# Patient Record
Sex: FEMALE | Race: BLACK OR AFRICAN AMERICAN
[De-identification: names, ages, dates, MRNs, and addresses within clinical notes are randomized per-mention and may not be internally consistent; named-entity substitution may affect disease eponyms.]

---
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
## 2022-05-06 DIAGNOSIS — Z87.42 PERSONAL HISTORY OF OTHER DISEASES OF THE FEMALE GENITAL TRACT: ICD-10-CM

## 2022-05-06 DIAGNOSIS — Z83.3 FAMILY HISTORY OF DIABETES MELLITUS: ICD-10-CM

## 2022-08-05 ENCOUNTER — APPOINTMENT (OUTPATIENT)
Dept: OBGYN | Facility: CLINIC | Age: 22
End: 2022-08-05

## 2022-08-05 VITALS
DIASTOLIC BLOOD PRESSURE: 70 MMHG | HEIGHT: 62 IN | SYSTOLIC BLOOD PRESSURE: 100 MMHG | BODY MASS INDEX: 26.13 KG/M2 | WEIGHT: 142 LBS

## 2022-08-05 DIAGNOSIS — N92.6 IRREGULAR MENSTRUATION, UNSPECIFIED: ICD-10-CM

## 2022-08-05 PROCEDURE — 99203 OFFICE O/P NEW LOW 30 MIN: CPT

## 2022-08-05 NOTE — END OF VISIT
[FreeTextEntry3] : I, Erika Fagan , acted as a scribe on behalf of Dr. Rosa Isela Zaman on 08/05/2022 .\par \par All medical entries made by the scribe were at my, Dr. Rosa Isela Zaman, direction and personally dictated by me on 08/05/2022  I have reviewed the chart and agree that the record accurately reflects my personal performance of the history, physical exam, assessment and plan. I have also personally directed, reviewed, and agreed with the chart.\par

## 2022-08-05 NOTE — PLAN
[FreeTextEntry1] : 20 y/o female  LMP 22 presents for irregular bleeding.\par \par Irregular menses\par -rx pelvic sono\par -f/u with results to discuss BC options if needed

## 2022-08-05 NOTE — HISTORY OF PRESENT ILLNESS
[FreeTextEntry1] : 20 y/o female  LMP 22 presents for irregular bleeding. Pt menarche at age 12. Pt states intermenstrual bleeding. Pt begins bleeding again 2 weeks after end of last menses.\par \par GynHx: vulvovaginitis \par PmHx: denies\par FamHx: DM (MGM)\par NKDA\par \par \par

## 2023-07-31 ENCOUNTER — TRANSCRIPTION ENCOUNTER (OUTPATIENT)
Age: 23
End: 2023-07-31

## 2023-09-08 ENCOUNTER — APPOINTMENT (OUTPATIENT)
Dept: OBGYN | Facility: CLINIC | Age: 23
End: 2023-09-08
Payer: COMMERCIAL

## 2023-09-08 ENCOUNTER — RESULT REVIEW (OUTPATIENT)
Age: 23
End: 2023-09-08

## 2023-09-08 VITALS
SYSTOLIC BLOOD PRESSURE: 108 MMHG | DIASTOLIC BLOOD PRESSURE: 70 MMHG | HEIGHT: 62 IN | WEIGHT: 142 LBS | BODY MASS INDEX: 26.13 KG/M2

## 2023-09-08 DIAGNOSIS — Z01.419 ENCOUNTER FOR GYNECOLOGICAL EXAMINATION (GENERAL) (ROUTINE) W/OUT ABNORMAL FINDINGS: ICD-10-CM

## 2023-09-08 DIAGNOSIS — N93.9 ABNORMAL UTERINE AND VAGINAL BLEEDING, UNSPECIFIED: ICD-10-CM

## 2023-09-08 DIAGNOSIS — N94.6 DYSMENORRHEA, UNSPECIFIED: ICD-10-CM

## 2023-09-08 PROCEDURE — 99395 PREV VISIT EST AGE 18-39: CPT

## 2023-09-08 PROCEDURE — 99214 OFFICE O/P EST MOD 30 MIN: CPT | Mod: 25

## 2023-09-08 PROCEDURE — 36415 COLL VENOUS BLD VENIPUNCTURE: CPT

## 2023-09-08 RX ORDER — NAPROXEN SODIUM 550 MG/1
550 TABLET ORAL
Qty: 60 | Refills: 6 | Status: ACTIVE | COMMUNITY
Start: 2023-09-08 | End: 1900-01-01

## 2023-09-08 NOTE — HISTORY OF PRESENT ILLNESS
[FreeTextEntry1] : 23yo nullip LMP 8/25/23 here for annual exam.  Reports for the past 2 years she has been experiencing spotting for 1 week, 2 weeks following menses.  Pt also reports painful menses as well as pain w/defecation.  [Regular Cycle Intervals] : periods have been regular [Frequency: Q ___ days] : menstrual periods occur approximately every [unfilled] days [Menarche Age: ____] : age at menarche was [unfilled] [Never active] : never active

## 2023-09-08 NOTE — DISCUSSION/SUMMARY
[FreeTextEntry1] : - hormone panel sent - sono ordered - Naproxen sent for dysmenorrhea - Discussed symptoms likely related to endometriosis

## 2023-09-10 LAB
BASOPHILS # BLD AUTO: 0.01 K/UL
BASOPHILS NFR BLD AUTO: 0.3 %
DHEA-S SERPL-MCNC: 298 UG/DL
EOSINOPHIL # BLD AUTO: 0.04 K/UL
EOSINOPHIL NFR BLD AUTO: 1.1 %
ESTRADIOL SERPL-MCNC: 204 PG/ML
FSH SERPL-MCNC: 6.5 IU/L
HCT VFR BLD CALC: 38 %
HGB BLD-MCNC: 12.2 G/DL
IMM GRANULOCYTES NFR BLD AUTO: 0 %
LH SERPL-ACNC: 45.3 IU/L
LYMPHOCYTES # BLD AUTO: 1.74 K/UL
LYMPHOCYTES NFR BLD AUTO: 49.2 %
MAN DIFF?: NORMAL
MCHC RBC-ENTMCNC: 29.3 PG
MCHC RBC-ENTMCNC: 32.1 GM/DL
MCV RBC AUTO: 91.1 FL
MONOCYTES # BLD AUTO: 0.26 K/UL
MONOCYTES NFR BLD AUTO: 7.3 %
NEUTROPHILS # BLD AUTO: 1.49 K/UL
NEUTROPHILS NFR BLD AUTO: 42.1 %
PLATELET # BLD AUTO: 374 K/UL
PROGEST SERPL-MCNC: 0.3 NG/ML
PROLACTIN SERPL-MCNC: 21.3 NG/ML
RBC # BLD: 4.17 M/UL
RBC # FLD: 13.2 %
T4 FREE SERPL-MCNC: 1.1 NG/DL
TESTOST SERPL-MCNC: 30.5 NG/DL
TSH SERPL-ACNC: 0.17 UIU/ML
WBC # FLD AUTO: 3.54 K/UL

## 2023-09-13 DIAGNOSIS — E05.90 THYROTOXICOSIS, UNSPECIFIED W/OUT THYROTOXIC CRISIS OR STORM: ICD-10-CM

## 2023-09-23 ENCOUNTER — OUTPATIENT (OUTPATIENT)
Dept: OUTPATIENT SERVICES | Facility: HOSPITAL | Age: 23
LOS: 1 days | End: 2023-09-23
Payer: COMMERCIAL

## 2023-09-23 ENCOUNTER — APPOINTMENT (OUTPATIENT)
Dept: ULTRASOUND IMAGING | Facility: CLINIC | Age: 23
End: 2023-09-23
Payer: COMMERCIAL

## 2023-09-23 DIAGNOSIS — N92.6 IRREGULAR MENSTRUATION, UNSPECIFIED: ICD-10-CM

## 2023-09-23 DIAGNOSIS — N93.9 ABNORMAL UTERINE AND VAGINAL BLEEDING, UNSPECIFIED: ICD-10-CM

## 2023-09-23 PROCEDURE — 76856 US EXAM PELVIC COMPLETE: CPT

## 2023-09-23 PROCEDURE — 76856 US EXAM PELVIC COMPLETE: CPT | Mod: 26

## 2024-03-07 ENCOUNTER — APPOINTMENT (OUTPATIENT)
Dept: INTERNAL MEDICINE | Facility: CLINIC | Age: 24
End: 2024-03-07
Payer: COMMERCIAL

## 2024-03-07 ENCOUNTER — LABORATORY RESULT (OUTPATIENT)
Age: 24
End: 2024-03-07

## 2024-03-07 VITALS
TEMPERATURE: 98.1 F | OXYGEN SATURATION: 99 % | BODY MASS INDEX: 27.19 KG/M2 | WEIGHT: 144 LBS | HEIGHT: 61 IN | SYSTOLIC BLOOD PRESSURE: 100 MMHG | HEART RATE: 87 BPM | DIASTOLIC BLOOD PRESSURE: 72 MMHG

## 2024-03-07 DIAGNOSIS — Z86.79 PERSONAL HISTORY OF OTHER DISEASES OF THE CIRCULATORY SYSTEM: ICD-10-CM

## 2024-03-07 DIAGNOSIS — Z83.79 FAMILY HISTORY OF OTHER DISEASES OF THE DIGESTIVE SYSTEM: ICD-10-CM

## 2024-03-07 DIAGNOSIS — J30.2 OTHER SEASONAL ALLERGIC RHINITIS: ICD-10-CM

## 2024-03-07 DIAGNOSIS — Z83.2 FAMILY HISTORY OF DISEASES OF THE BLOOD AND BLOOD-FORMING ORGANS AND CERTAIN DISORDERS INVOLVING THE IMMUNE MECHANISM: ICD-10-CM

## 2024-03-07 DIAGNOSIS — H93.13 TINNITUS, BILATERAL: ICD-10-CM

## 2024-03-07 DIAGNOSIS — Z97.3 PRESENCE OF SPECTACLES AND CONTACT LENSES: ICD-10-CM

## 2024-03-07 DIAGNOSIS — L30.9 DERMATITIS, UNSPECIFIED: ICD-10-CM

## 2024-03-07 DIAGNOSIS — Z78.9 OTHER SPECIFIED HEALTH STATUS: ICD-10-CM

## 2024-03-07 DIAGNOSIS — D64.9 ANEMIA, UNSPECIFIED: ICD-10-CM

## 2024-03-07 DIAGNOSIS — Z00.00 ENCOUNTER FOR GENERAL ADULT MEDICAL EXAMINATION W/OUT ABNORMAL FINDINGS: ICD-10-CM

## 2024-03-07 DIAGNOSIS — Z87.09 PERSONAL HISTORY OF OTHER DISEASES OF THE RESPIRATORY SYSTEM: ICD-10-CM

## 2024-03-07 DIAGNOSIS — K59.00 CONSTIPATION, UNSPECIFIED: ICD-10-CM

## 2024-03-07 PROCEDURE — 99385 PREV VISIT NEW AGE 18-39: CPT

## 2024-03-08 PROBLEM — K59.00 CONSTIPATION IN FEMALE: Status: ACTIVE | Noted: 2024-03-08

## 2024-03-08 LAB
25(OH)D3 SERPL-MCNC: 34.9 NG/ML
ALBUMIN SERPL ELPH-MCNC: 4.8 G/DL
ALP BLD-CCNC: 55 U/L
ALT SERPL-CCNC: 15 U/L
ANION GAP SERPL CALC-SCNC: 18 MMOL/L
APPEARANCE: ABNORMAL
AST SERPL-CCNC: 15 U/L
BACTERIA: ABNORMAL /HPF
BASOPHILS # BLD AUTO: 0.03 K/UL
BASOPHILS NFR BLD AUTO: 0.5 %
BILIRUB SERPL-MCNC: 0.6 MG/DL
BILIRUBIN URINE: NEGATIVE
BLOOD URINE: ABNORMAL
BUN SERPL-MCNC: 16 MG/DL
CALCIUM SERPL-MCNC: 10 MG/DL
CAST: 1 /LPF
CHLORIDE SERPL-SCNC: 101 MMOL/L
CHOLEST SERPL-MCNC: 144 MG/DL
CO2 SERPL-SCNC: 18 MMOL/L
COLOR: YELLOW
COVID-19 NUCLEOCAPSID  GAM ANTIBODY INTERPRETATION: POSITIVE
COVID-19 SPIKE DOMAIN ANTIBODY INTERPRETATION: POSITIVE
CREAT SERPL-MCNC: 0.89 MG/DL
EGFR: 95 ML/MIN/1.73M2
EOSINOPHIL # BLD AUTO: 0.04 K/UL
EOSINOPHIL NFR BLD AUTO: 0.7 %
EPITHELIAL CELLS: 33 /HPF
ESTIMATED AVERAGE GLUCOSE: 91 MG/DL
FOLATE SERPL-MCNC: >20 NG/ML
GLUCOSE QUALITATIVE U: NEGATIVE MG/DL
GLUCOSE SERPL-MCNC: 74 MG/DL
HAPTOGLOB SERPL-MCNC: 93 MG/DL
HBA1C MFR BLD HPLC: 4.8 %
HCT VFR BLD CALC: 36.4 %
HDLC SERPL-MCNC: 60 MG/DL
HGB BLD-MCNC: 12.4 G/DL
IMM GRANULOCYTES NFR BLD AUTO: 0.2 %
IRON SATN MFR SERPL: 43 %
IRON SERPL-MCNC: 166 UG/DL
KETONES URINE: 40 MG/DL
LDLC SERPL CALC-MCNC: 69 MG/DL
LEUKOCYTE ESTERASE URINE: ABNORMAL
LYMPHOCYTES # BLD AUTO: 2.3 K/UL
LYMPHOCYTES NFR BLD AUTO: 38.1 %
MAN DIFF?: NORMAL
MCHC RBC-ENTMCNC: 29.4 PG
MCHC RBC-ENTMCNC: 34.1 GM/DL
MCV RBC AUTO: 86.3 FL
MICROSCOPIC-UA: NORMAL
MONOCYTES # BLD AUTO: 0.32 K/UL
MONOCYTES NFR BLD AUTO: 5.3 %
NEUTROPHILS # BLD AUTO: 3.33 K/UL
NEUTROPHILS NFR BLD AUTO: 55.2 %
NITRITE URINE: NEGATIVE
NONHDLC SERPL-MCNC: 84 MG/DL
PH URINE: 6
PLATELET # BLD AUTO: 377 K/UL
POTASSIUM SERPL-SCNC: 4.4 MMOL/L
PROT SERPL-MCNC: 8.1 G/DL
PROTEIN URINE: NEGATIVE MG/DL
RBC # BLD: 4.22 M/UL
RBC # FLD: 12.8 %
RED BLOOD CELLS URINE: 3 /HPF
SARS-COV-2 AB SERPL IA-ACNC: >250 U/ML
SARS-COV-2 AB SERPL QL IA: 215 INDEX
SODIUM SERPL-SCNC: 138 MMOL/L
SPECIFIC GRAVITY URINE: 1.03
TIBC SERPL-MCNC: 384 UG/DL
TRIGL SERPL-MCNC: 77 MG/DL
TSH SERPL-ACNC: 0.22 UIU/ML
UIBC SERPL-MCNC: 218 UG/DL
UROBILINOGEN URINE: 0.2 MG/DL
VIT B12 SERPL-MCNC: 847 PG/ML
WBC # FLD AUTO: 6.03 K/UL
WHITE BLOOD CELLS URINE: 11 /HPF

## 2024-03-08 NOTE — HEALTH RISK ASSESSMENT
[Good] : ~his/her~ current health as good [Very Good] : ~his/her~  mood as very good [Intercurrent ED visits] : went to ED [No] : In the past 12 months have you used drugs other than those required for medical reasons? No [No falls in past year] : Patient reported no falls in the past year [0] : 2) Feeling down, depressed, or hopeless: Not at all (0) [PHQ-2 Negative - No further assessment needed] : PHQ-2 Negative - No further assessment needed [PHQ-9 Negative - No further assessment needed] : PHQ-9 Negative - No further assessment needed [Hepatitis C test offered] : Hepatitis C test offered [HIV Test offered] : HIV Test offered [None] : None [With Family] : lives with family [# of Members in Household ___] :  household currently consist of [unfilled] member(s) [Student] : student [College] : College [Single] : single [# Of Children ___] : has [unfilled] children [Feels Safe at Home] : Feels safe at home [Fully functional (using the telephone, shopping, preparing meals, housekeeping, doing laundry, using] : Fully functional and needs no help or supervision to perform IADLs (using the telephone, shopping, preparing meals, housekeeping, doing laundry, using transportation, managing medications and managing finances) [Fully functional (bathing, dressing, toileting, transferring, walking, feeding)] : Fully functional (bathing, dressing, toileting, transferring, walking, feeding) [Smoke Detector] : smoke detector [Carbon Monoxide Detector] : carbon monoxide detector [Sunscreen] : uses sunscreen [Seat Belt] :  uses seat belt [With Patient/Caregiver] : , with patient/caregiver [Designated Healthcare Proxy] : Designated healthcare proxy [Relationship: ___] : Relationship: [unfilled] [Never] : Never [de-identified] : GYN, endocrine, dentist, ophtho [de-identified] : exercises [[id number]] : 0 [de-identified] : regular [Reports changes in hearing] : Reports no changes in hearing [Change in mental status noted] : No change in mental status noted [Reports changes in vision] : Reports no changes in vision [Reports changes in dental health] : Reports no changes in dental health [Guns at Home] : no guns at home [Travel to Developing Areas] : does not  travel to developing areas [TB Exposure] : is not being exposed to tuberculosis [Caregiver Concerns] : does not have caregiver concerns [PapSmearDate] : 09/23 [de-identified] : [facility]  [AdvancecareDate] : 03/24 [FreeTextEntry4] : [phone]

## 2024-03-08 NOTE — ASSESSMENT
[FreeTextEntry1] : See health maintenance assessment and plan outlined below.  In addition: Full labs and urine testing done today Increase water intake and fiber intake in diet GI follow-up 2024 GYN follow-up 2024 for persistent abnormal uterine bleeding Continue medications, diet, exercise as outlined Chest x-ray declined Endocrine follow-up 2024 Vaccines reviewed ENT/allergy follow-up 2024 Dental follow-up 2024 Ophthalmology follow-up 2024 for full eye exam Dermatology follow-up 2024 for full skin exam Neuro follow-up 2024 as needed for migraines She will return in follow-up for her annual physical and as needed She will call if her status worsens or changes or for any medical issues and return to be seen immediately All of the above was discussed in detail with the patient and her mother and all questions were answered They verbally confirmed understanding of all of the above and agreement with the above plan

## 2024-03-08 NOTE — REVIEW OF SYSTEMS
[Constipation] : constipation [Skin Rash] : skin rash [Dysmenorrhea] : dysmenorrhea [Headache] : headache [Negative] : Heme/Lymph

## 2024-03-08 NOTE — PHYSICAL EXAM
[No Acute Distress] : no acute distress [Well Nourished] : well nourished [Well Developed] : well developed [Well-Appearing] : well-appearing [Normal Sclera/Conjunctiva] : normal sclera/conjunctiva [PERRL] : pupils equal round and reactive to light [EOMI] : extraocular movements intact [Normal Outer Ear/Nose] : the outer ears and nose were normal in appearance [Normal Oropharynx] : the oropharynx was normal [No JVD] : no jugular venous distention [No Lymphadenopathy] : no lymphadenopathy [Supple] : supple [Thyroid Normal, No Nodules] : the thyroid was normal and there were no nodules present [No Respiratory Distress] : no respiratory distress  [No Accessory Muscle Use] : no accessory muscle use [Clear to Auscultation] : lungs were clear to auscultation bilaterally [Normal Rate] : normal rate  [Regular Rhythm] : with a regular rhythm [Normal S1, S2] : normal S1 and S2 [No Murmur] : no murmur heard [No Carotid Bruits] : no carotid bruits [No Abdominal Bruit] : a ~M bruit was not heard ~T in the abdomen [No Varicosities] : no varicosities [Pedal Pulses Present] : the pedal pulses are present [No Edema] : there was no peripheral edema [No Extremity Clubbing/Cyanosis] : no extremity clubbing/cyanosis [No Palpable Aorta] : no palpable aorta [Soft] : abdomen soft [Non Tender] : non-tender [Non-distended] : non-distended [Normal Bowel Sounds] : normal bowel sounds [No HSM] : no HSM [Normal Posterior Cervical Nodes] : no posterior cervical lymphadenopathy [Normal Anterior Cervical Nodes] : no anterior cervical lymphadenopathy [No CVA Tenderness] : no CVA  tenderness [No Spinal Tenderness] : no spinal tenderness [No Joint Swelling] : no joint swelling [Grossly Normal Strength/Tone] : grossly normal strength/tone [No Rash] : no rash [Coordination Grossly Intact] : coordination grossly intact [No Focal Deficits] : no focal deficits [Deep Tendon Reflexes (DTR)] : deep tendon reflexes were 2+ and symmetric [Normal Gait] : normal gait [Normal Insight/Judgement] : insight and judgment were intact [Normal Affect] : the affect was normal [Normal Voice/Communication] : normal voice/communication [Normal TMs] : both tympanic membranes were normal [Normal Appearance] : normal in appearance [No Masses] : no palpable masses [No Axillary Lymphadenopathy] : no axillary lymphadenopathy [No Nipple Discharge] : no nipple discharge [Declined Rectal Exam] : declined rectal exam [Normal Supraclavicular Nodes] : no supraclavicular lymphadenopathy [Speech Grossly Normal] : speech grossly normal [Normal Axillary Nodes] : no axillary lymphadenopathy [Alert and Oriented x3] : oriented to person, place, and time [Memory Grossly Normal] : memory grossly normal [de-identified] : Wears glasses [Normal Mood] : the mood was normal [de-identified] : No sinus tenderness [de-identified] : No stridor or TM [de-identified] : R = 16; normal air entry [de-identified] : Normal bilateral radial pulses; no cords [de-identified] : As per GYN

## 2024-03-11 LAB
ALBUMIN MFR SERPL ELPH: 55.6 %
ALBUMIN SERPL-MCNC: 4.5 G/DL
ALBUMIN/GLOB SERPL: 1.2 RATIO
ALPHA1 GLOB MFR SERPL ELPH: 3.7 %
ALPHA1 GLOB SERPL ELPH-MCNC: 0.3 G/DL
ALPHA2 GLOB MFR SERPL ELPH: 9 %
ALPHA2 GLOB SERPL ELPH-MCNC: 0.7 G/DL
B-GLOBULIN MFR SERPL ELPH: 11.1 %
B-GLOBULIN SERPL ELPH-MCNC: 0.9 G/DL
GAMMA GLOB FLD ELPH-MCNC: 1.7 G/DL
GAMMA GLOB MFR SERPL ELPH: 20.6 %
HGB A MFR BLD: 97.1 %
HGB A2 MFR BLD: 2.9 %
HGB FRACT BLD-IMP: NORMAL
INTERPRETATION SERPL IEP-IMP: NORMAL
PROT SERPL-MCNC: 8.1 G/DL
PROT SERPL-MCNC: 8.1 G/DL

## 2024-04-05 ENCOUNTER — LABORATORY RESULT (OUTPATIENT)
Age: 24
End: 2024-04-05

## 2024-04-05 ENCOUNTER — APPOINTMENT (OUTPATIENT)
Dept: OBGYN | Facility: CLINIC | Age: 24
End: 2024-04-05
Payer: COMMERCIAL

## 2024-04-05 VITALS
SYSTOLIC BLOOD PRESSURE: 106 MMHG | WEIGHT: 145 LBS | BODY MASS INDEX: 27.38 KG/M2 | HEIGHT: 61 IN | DIASTOLIC BLOOD PRESSURE: 60 MMHG

## 2024-04-05 DIAGNOSIS — N89.8 OTHER SPECIFIED NONINFLAMMATORY DISORDERS OF VAGINA: ICD-10-CM

## 2024-04-05 PROCEDURE — 99213 OFFICE O/P EST LOW 20 MIN: CPT

## 2024-04-05 NOTE — PHYSICAL EXAM
[Appropriately responsive] : appropriately responsive [Alert] : alert [No Acute Distress] : no acute distress [Soft] : soft [Non-tender] : non-tender [Non-distended] : non-distended [No HSM] : No HSM [No Lesions] : no lesions [No Mass] : no mass [Oriented x3] : oriented x3 [Labia Majora] : normal [Labia Minora] : normal [Discharge] : a  ~M vaginal discharge was present [Scant] : scant [White] : white [Normal] : normal [Uterine Adnexae] : normal

## 2024-04-05 NOTE — HISTORY OF PRESENT ILLNESS
[FreeTextEntry1] : 24yo LMP 3/16/24 here w/vaginal discharge. [No] : Patient does not have concerns regarding sex [Never active] : never active

## 2024-04-08 DIAGNOSIS — B96.89 ACUTE VAGINITIS: ICD-10-CM

## 2024-04-08 DIAGNOSIS — N76.0 ACUTE VAGINITIS: ICD-10-CM

## 2024-04-08 LAB
CANDIDA VAG CYTO: NOT DETECTED
G VAGINALIS+PREV SP MTYP VAG QL MICRO: DETECTED
SOURCE AMPLIFICATION: NORMAL
T VAGINALIS RRNA SPEC QL NAA+PROBE: NOT DETECTED
T VAGINALIS VAG QL WET PREP: NOT DETECTED

## 2024-06-24 ENCOUNTER — APPOINTMENT (OUTPATIENT)
Dept: INTERNAL MEDICINE | Facility: CLINIC | Age: 24
End: 2024-06-24
Payer: COMMERCIAL

## 2024-06-24 VITALS
HEIGHT: 61 IN | SYSTOLIC BLOOD PRESSURE: 114 MMHG | DIASTOLIC BLOOD PRESSURE: 70 MMHG | WEIGHT: 150 LBS | OXYGEN SATURATION: 99 % | HEART RATE: 85 BPM | TEMPERATURE: 98.5 F | BODY MASS INDEX: 28.32 KG/M2

## 2024-06-24 DIAGNOSIS — G43.909 MIGRAINE, UNSPECIFIED, NOT INTRACTABLE, W/OUT STATUS MIGRAINOSUS: ICD-10-CM

## 2024-06-24 DIAGNOSIS — L73.9 FOLLICULAR DISORDER, UNSPECIFIED: ICD-10-CM

## 2024-06-24 DIAGNOSIS — Z23 ENCOUNTER FOR IMMUNIZATION: ICD-10-CM

## 2024-06-24 PROCEDURE — 99213 OFFICE O/P EST LOW 20 MIN: CPT

## 2024-06-24 PROCEDURE — G2211 COMPLEX E/M VISIT ADD ON: CPT | Mod: NC

## 2024-06-24 RX ORDER — CEPHALEXIN 500 MG/1
500 CAPSULE ORAL 3 TIMES DAILY
Qty: 30 | Refills: 0 | Status: ACTIVE | COMMUNITY
Start: 2024-06-24 | End: 1900-01-01

## 2024-06-24 RX ORDER — METRONIDAZOLE 500 MG/1
500 TABLET ORAL TWICE DAILY
Qty: 14 | Refills: 0 | Status: DISCONTINUED | COMMUNITY
Start: 2024-04-08 | End: 2024-06-24

## 2024-06-26 LAB
HBV SURFACE AB SER QL: NONREACTIVE
HBV SURFACE AB SERPL IA-ACNC: <3 MIU/ML
HBV SURFACE AG SER QL: NONREACTIVE
MEV IGG FLD QL IA: 96.7 AU/ML
MEV IGG+IGM SER-IMP: POSITIVE
MUV AB SER-ACNC: POSITIVE
MUV IGG SER QL IA: 288 AU/ML
RUBV IGG FLD-ACNC: 3.23 INDEX
RUBV IGG SER-IMP: POSITIVE
VZV AB TITR SER: POSITIVE
VZV IGG SER IF-ACNC: 311.5 INDEX

## 2024-06-27 LAB
M TB IFN-G BLD-IMP: NEGATIVE
QUANTIFERON TB PLUS MITOGEN MINUS NIL: 6.01 IU/ML
QUANTIFERON TB PLUS NIL: 0.04 IU/ML
QUANTIFERON TB PLUS TB1 MINUS NIL: 0 IU/ML
QUANTIFERON TB PLUS TB2 MINUS NIL: 0 IU/ML

## 2024-07-09 ENCOUNTER — TRANSCRIPTION ENCOUNTER (OUTPATIENT)
Age: 24
End: 2024-07-09

## 2024-07-09 DIAGNOSIS — Z23 ENCOUNTER FOR IMMUNIZATION: ICD-10-CM

## 2024-07-10 ENCOUNTER — TRANSCRIPTION ENCOUNTER (OUTPATIENT)
Age: 24
End: 2024-07-10

## 2024-07-16 ENCOUNTER — APPOINTMENT (OUTPATIENT)
Dept: INTERNAL MEDICINE | Facility: CLINIC | Age: 24
End: 2024-07-16
Payer: COMMERCIAL

## 2024-07-16 PROCEDURE — 90739 HEPB VACC 2/4 DOSE ADULT IM: CPT

## 2024-07-16 PROCEDURE — G0010: CPT

## 2024-07-23 ENCOUNTER — TRANSCRIPTION ENCOUNTER (OUTPATIENT)
Age: 24
End: 2024-07-23

## 2024-07-25 ENCOUNTER — APPOINTMENT (OUTPATIENT)
Dept: INTERNAL MEDICINE | Facility: CLINIC | Age: 24
End: 2024-07-25
Payer: COMMERCIAL

## 2024-07-25 DIAGNOSIS — Z23 ENCOUNTER FOR IMMUNIZATION: ICD-10-CM

## 2024-07-25 PROCEDURE — 90733 MPSV4 VACCINE SUBQ: CPT

## 2024-07-25 PROCEDURE — 90471 IMMUNIZATION ADMIN: CPT

## 2024-07-26 ENCOUNTER — APPOINTMENT (OUTPATIENT)
Dept: CARDIOLOGY | Facility: CLINIC | Age: 24
End: 2024-07-26
Payer: COMMERCIAL

## 2024-07-26 VITALS — WEIGHT: 150 LBS

## 2024-07-26 DIAGNOSIS — E66.3 OVERWEIGHT: ICD-10-CM

## 2024-07-26 PROCEDURE — 97802 MEDICAL NUTRITION INDIV IN: CPT | Mod: 95

## 2024-08-23 ENCOUNTER — APPOINTMENT (OUTPATIENT)
Dept: INTERNAL MEDICINE | Facility: CLINIC | Age: 24
End: 2024-08-23
Payer: COMMERCIAL

## 2024-08-23 PROCEDURE — 36415 COLL VENOUS BLD VENIPUNCTURE: CPT

## 2024-08-26 ENCOUNTER — NON-APPOINTMENT (OUTPATIENT)
Age: 24
End: 2024-08-26

## 2024-08-26 LAB
HBV SURFACE AB SER QL: REACTIVE
HBV SURFACE AB SERPL IA-ACNC: 964 MIU/ML
HBV SURFACE AG SER QL: NONREACTIVE

## 2024-09-13 ENCOUNTER — APPOINTMENT (OUTPATIENT)
Dept: CARDIOLOGY | Facility: CLINIC | Age: 24
End: 2024-09-13

## 2024-09-13 DIAGNOSIS — E66.3 OVERWEIGHT: ICD-10-CM

## 2024-09-13 PROCEDURE — 97803 MED NUTRITION INDIV SUBSEQ: CPT | Mod: 95

## 2024-11-01 ENCOUNTER — APPOINTMENT (OUTPATIENT)
Dept: CARDIOLOGY | Facility: CLINIC | Age: 24
End: 2024-11-01

## 2024-11-01 DIAGNOSIS — E66.3 OVERWEIGHT: ICD-10-CM

## 2024-11-01 PROCEDURE — 97803 MED NUTRITION INDIV SUBSEQ: CPT | Mod: 95

## 2025-01-09 ENCOUNTER — APPOINTMENT (OUTPATIENT)
Dept: CARDIOLOGY | Facility: CLINIC | Age: 25
End: 2025-01-09
Payer: COMMERCIAL

## 2025-01-09 DIAGNOSIS — E66.3 OVERWEIGHT: ICD-10-CM

## 2025-01-09 PROCEDURE — 97803 MED NUTRITION INDIV SUBSEQ: CPT | Mod: 95

## 2025-03-06 ENCOUNTER — APPOINTMENT (OUTPATIENT)
Dept: CARDIOLOGY | Facility: CLINIC | Age: 25
End: 2025-03-06
Payer: COMMERCIAL

## 2025-03-06 PROCEDURE — 97803 MED NUTRITION INDIV SUBSEQ: CPT | Mod: 95

## 2025-03-12 ENCOUNTER — APPOINTMENT (OUTPATIENT)
Dept: INTERNAL MEDICINE | Facility: CLINIC | Age: 25
End: 2025-03-12
Payer: COMMERCIAL

## 2025-03-12 VITALS
TEMPERATURE: 98.4 F | SYSTOLIC BLOOD PRESSURE: 110 MMHG | DIASTOLIC BLOOD PRESSURE: 68 MMHG | HEART RATE: 84 BPM | HEIGHT: 61 IN | WEIGHT: 150 LBS | BODY MASS INDEX: 28.32 KG/M2 | OXYGEN SATURATION: 99 %

## 2025-03-12 DIAGNOSIS — Z00.00 ENCOUNTER FOR GENERAL ADULT MEDICAL EXAMINATION W/OUT ABNORMAL FINDINGS: ICD-10-CM

## 2025-03-12 PROCEDURE — 99395 PREV VISIT EST AGE 18-39: CPT

## 2025-03-14 LAB
25(OH)D3 SERPL-MCNC: 36.7 NG/ML
ALBUMIN SERPL ELPH-MCNC: 4.4 G/DL
ALP BLD-CCNC: 61 U/L
ALT SERPL-CCNC: 9 U/L
AMPHET UR-MCNC: NEGATIVE NG/ML
ANION GAP SERPL CALC-SCNC: 13 MMOL/L
AST SERPL-CCNC: 14 U/L
BARBITURATES UR-MCNC: NEGATIVE NG/ML
BASOPHILS # BLD AUTO: 0.02 K/UL
BASOPHILS NFR BLD AUTO: 0.3 %
BENZODIAZ UR-MCNC: NEGATIVE NG/ML
BILIRUB SERPL-MCNC: 0.3 MG/DL
BUN SERPL-MCNC: 14 MG/DL
CALCIUM SERPL-MCNC: 9.5 MG/DL
CHLORIDE SERPL-SCNC: 101 MMOL/L
CHOLEST SERPL-MCNC: 146 MG/DL
CO2 SERPL-SCNC: 22 MMOL/L
COCAINE METAB.OTHER UR-MCNC: NEGATIVE NG/ML
COVID-19 NUCLEOCAPSID  GAM ANTIBODY INTERPRETATION: POSITIVE
COVID-19 SPIKE DOMAIN ANTIBODY INTERPRETATION: POSITIVE
CREAT SERPL-MCNC: 0.82 MG/DL
CREATININE, URINE: 41 MG/DL
EGFRCR SERPLBLD CKD-EPI 2021: 104 ML/MIN/1.73M2
EOSINOPHIL # BLD AUTO: 0.05 K/UL
EOSINOPHIL NFR BLD AUTO: 0.8 %
ESTIMATED AVERAGE GLUCOSE: 91 MG/DL
FENTANYL, URINE: NEGATIVE NG/ML
FOLATE SERPL-MCNC: 15.8 NG/ML
GLUCOSE SERPL-MCNC: 78 MG/DL
HBA1C MFR BLD HPLC: 4.8 %
HBV SURFACE AB SER QL: REACTIVE
HBV SURFACE AB SERPL IA-ACNC: 434.4 MIU/ML
HBV SURFACE AG SER QL: NONREACTIVE
HCT VFR BLD CALC: 33.9 %
HDLC SERPL-MCNC: 54 MG/DL
HGB BLD-MCNC: 11.7 G/DL
HIV1+2 AB SPEC QL IA.RAPID: NONREACTIVE
IMM GRANULOCYTES NFR BLD AUTO: 0.3 %
IRON SERPL-MCNC: 50 UG/DL
LDLC SERPL CALC-MCNC: 79 MG/DL
LYMPHOCYTES # BLD AUTO: 2.5 K/UL
LYMPHOCYTES NFR BLD AUTO: 40.6 %
MAN DIFF?: NORMAL
MCHC RBC-ENTMCNC: 29.3 PG
MCHC RBC-ENTMCNC: 34.5 G/DL
MCV RBC AUTO: 85 FL
METHADONE SCREEN, UR: NEGATIVE NG/ML
MEV IGG FLD QL IA: 84.7 AU/ML
MEV IGG+IGM SER-IMP: POSITIVE
MONOCYTES # BLD AUTO: 0.32 K/UL
MONOCYTES NFR BLD AUTO: 5.2 %
MUV AB SER-ACNC: POSITIVE
MUV IGG SER QL IA: 242 AU/ML
NEUTROPHILS # BLD AUTO: 3.25 K/UL
NEUTROPHILS NFR BLD AUTO: 52.8 %
NONHDLC SERPL-MCNC: 93 MG/DL
OPIATES UR-MCNC: NEGATIVE NG/ML
OXYCODONE/OXYMORPHONE, URINE: NEGATIVE NG/ML
PCP UR-MCNC: NEGATIVE NG/ML
PH, URINE: 5.6
PLATELET # BLD AUTO: 424 K/UL
POTASSIUM SERPL-SCNC: 4.2 MMOL/L
PROT SERPL-MCNC: 7.5 G/DL
RBC # BLD: 3.99 M/UL
RBC # FLD: 13.1 %
RUBV IGG FLD-ACNC: 2.5 INDEX
RUBV IGG SER-IMP: POSITIVE
SARS-COV-2 AB SERPL IA-ACNC: >250 U/ML
SARS-COV-2 AB SERPL QL IA: 109 INDEX
SODIUM SERPL-SCNC: 136 MMOL/L
THC UR QL: NEGATIVE NG/ML
TRIGL SERPL-MCNC: 65 MG/DL
TSH SERPL-ACNC: 0.16 UIU/ML
VIT B12 SERPL-MCNC: 604 PG/ML
VZV AB TITR SER: POSITIVE
VZV IGG SER IF-ACNC: 1.8 S/CO
WBC # FLD AUTO: 6.16 K/UL

## 2025-03-15 LAB
APPEARANCE: CLEAR
BACTERIA: NEGATIVE /HPF
BILIRUBIN URINE: NEGATIVE
BLOOD URINE: ABNORMAL
CAST: 0 /LPF
COLOR: YELLOW
DRUG ABUSE PANEL-9, SERUM: NORMAL
EPITHELIAL CELLS: 2 /HPF
GLUCOSE QUALITATIVE U: NEGATIVE MG/DL
HCV AB SER QL: NONREACTIVE
HCV S/CO RATIO: 0.08 S/CO
KETONES URINE: NEGATIVE MG/DL
LEUKOCYTE ESTERASE URINE: NEGATIVE
MICROSCOPIC-UA: NORMAL
NITRITE URINE: NEGATIVE
PH URINE: 6
PROTEIN URINE: NEGATIVE MG/DL
RED BLOOD CELLS URINE: 1 /HPF
REVIEW: NORMAL
SPECIFIC GRAVITY URINE: 1.01
UROBILINOGEN URINE: 0.2 MG/DL
WHITE BLOOD CELLS URINE: 0 /HPF

## 2025-03-17 LAB
M TB IFN-G BLD-IMP: NEGATIVE
QUANTIFERON TB PLUS MITOGEN MINUS NIL: 8.06 IU/ML
QUANTIFERON TB PLUS NIL: 0.03 IU/ML
QUANTIFERON TB PLUS TB1 MINUS NIL: 0 IU/ML
QUANTIFERON TB PLUS TB2 MINUS NIL: 0 IU/ML

## 2025-05-12 ENCOUNTER — APPOINTMENT (OUTPATIENT)
Dept: GASTROENTEROLOGY | Facility: CLINIC | Age: 25
End: 2025-05-12
Payer: COMMERCIAL

## 2025-05-12 DIAGNOSIS — E66.3 OVERWEIGHT: ICD-10-CM

## 2025-05-12 PROCEDURE — 97803 MED NUTRITION INDIV SUBSEQ: CPT | Mod: 95

## 2025-07-14 ENCOUNTER — APPOINTMENT (OUTPATIENT)
Dept: GASTROENTEROLOGY | Facility: CLINIC | Age: 25
End: 2025-07-14

## 2025-09-16 ENCOUNTER — APPOINTMENT (OUTPATIENT)
Dept: OBGYN | Facility: CLINIC | Age: 25
End: 2025-09-16
Payer: COMMERCIAL

## 2025-09-16 VITALS
HEIGHT: 61 IN | BODY MASS INDEX: 28.32 KG/M2 | WEIGHT: 150 LBS | DIASTOLIC BLOOD PRESSURE: 74 MMHG | SYSTOLIC BLOOD PRESSURE: 110 MMHG

## 2025-09-16 DIAGNOSIS — N89.8 OTHER SPECIFIED NONINFLAMMATORY DISORDERS OF VAGINA: ICD-10-CM

## 2025-09-16 PROCEDURE — 99213 OFFICE O/P EST LOW 20 MIN: CPT

## 2025-09-16 PROCEDURE — 99459 PELVIC EXAMINATION: CPT | Mod: NC

## 2025-09-17 ENCOUNTER — NON-APPOINTMENT (OUTPATIENT)
Age: 25
End: 2025-09-17

## 2025-09-17 LAB
CANDIDA VAG CYTO: NOT DETECTED
G VAGINALIS+PREV SP MTYP VAG QL MICRO: DETECTED
T VAGINALIS VAG QL WET PREP: NOT DETECTED

## 2025-09-17 RX ORDER — METRONIDAZOLE 500 MG/1
500 TABLET ORAL TWICE DAILY
Qty: 14 | Refills: 0 | Status: ACTIVE | COMMUNITY
Start: 2025-09-17 | End: 1900-01-01